# Patient Record
Sex: FEMALE | Race: WHITE | URBAN - METROPOLITAN AREA
[De-identification: names, ages, dates, MRNs, and addresses within clinical notes are randomized per-mention and may not be internally consistent; named-entity substitution may affect disease eponyms.]

---

## 2020-02-07 ENCOUNTER — APPOINTMENT (RX ONLY)
Dept: URBAN - METROPOLITAN AREA CLINIC 143 | Facility: CLINIC | Age: 30
Setting detail: DERMATOLOGY
End: 2020-02-07

## 2020-02-07 DIAGNOSIS — L90.5 SCAR CONDITIONS AND FIBROSIS OF SKIN: ICD-10-CM

## 2020-02-07 DIAGNOSIS — L20.89 OTHER ATOPIC DERMATITIS: ICD-10-CM

## 2020-02-07 PROBLEM — L20.84 INTRINSIC (ALLERGIC) ECZEMA: Status: ACTIVE | Noted: 2020-02-07

## 2020-02-07 PROCEDURE — ? DEFER

## 2020-02-07 PROCEDURE — ? COUNSELING

## 2020-02-07 PROCEDURE — 99202 OFFICE O/P NEW SF 15 MIN: CPT

## 2020-02-07 PROCEDURE — ? TREATMENT REGIMEN

## 2020-02-07 PROCEDURE — ? PRESCRIPTION

## 2020-02-07 RX ORDER — TRIAMCINOLONE ACETONIDE 1 MG/G
CREAM TOPICAL
Qty: 454 | Refills: 4 | Status: ERX | COMMUNITY
Start: 2020-02-07

## 2020-02-07 RX ORDER — CRISABOROLE 20 MG/G
OINTMENT TOPICAL
Qty: 1 | Refills: 1 | Status: ERX | COMMUNITY
Start: 2020-02-07

## 2020-02-07 RX ADMIN — CRISABOROLE: 20 OINTMENT TOPICAL at 00:00

## 2020-02-07 RX ADMIN — TRIAMCINOLONE ACETONIDE: 1 CREAM TOPICAL at 00:00

## 2020-02-07 ASSESSMENT — LOCATION ZONE DERM: LOCATION ZONE: FACE

## 2020-02-07 ASSESSMENT — LOCATION SIMPLE DESCRIPTION DERM: LOCATION SIMPLE: LEFT CHEEK

## 2020-02-07 ASSESSMENT — LOCATION DETAILED DESCRIPTION DERM: LOCATION DETAILED: LEFT INFERIOR MEDIAL MALAR CHEEK

## 2020-02-07 NOTE — PROCEDURE: TREATMENT REGIMEN
Plan: Refered to Dr. Motta
Detail Level: Simple
Otc Regimen: use Skin Fix  hand cream PRN
Initiate Treatment: TAC BID x 2 wks then use Eucrisa BID x 2wks

## 2020-02-07 NOTE — PROCEDURE: DEFER
Detail Level: Detailed
Introduction Text (Please End With A Colon): The following procedure was deferred: scar revision jacob/ laney